# Patient Record
Sex: MALE | Race: WHITE | NOT HISPANIC OR LATINO | URBAN - METROPOLITAN AREA
[De-identification: names, ages, dates, MRNs, and addresses within clinical notes are randomized per-mention and may not be internally consistent; named-entity substitution may affect disease eponyms.]

---

## 2022-01-21 ENCOUNTER — OFFICE VISIT (OUTPATIENT)
Dept: URBAN - METROPOLITAN AREA CLINIC 27 | Facility: CLINIC | Age: 27
End: 2022-01-21

## 2022-01-21 PROBLEM — 238131007 OVERWEIGHT: Status: ACTIVE | Noted: 2022-01-21

## 2022-01-26 ENCOUNTER — LAB OUTSIDE AN ENCOUNTER (OUTPATIENT)
Dept: URBAN - METROPOLITAN AREA CLINIC 121 | Facility: CLINIC | Age: 27
End: 2022-01-26

## 2022-01-26 LAB
A/G RATIO: (no result)
ALBUMIN: 4.4
ALK PHOS: 71
ANTI-HAV: REACTIVE
ANTI-HBC: (no result)
BASOPH COUNT: (no result)
BASOPHIL %: 0.9
BG RANDOM: 90
BILI TOTAL: 0.3
BUN/CREAT: (no result)
BUN: 15
CALCIUM: 9.4
CHLORIDE: 103
CO2: 25
CREATININE: 1.18
CRP: 0.08
EOS COUNT: (no result)
EOSINOPHIL %: 4.5
FERRITIN: 16
GLOBULIN TOT: (no result)
HBSAG: (no result)
HCT: 40.6
HGB: 14.2
IRON SATUR %: (no result)
IRON: 69
LYMPHS %: 19.8
MCH: 29.1
MCHC: (no result)
MCV: 83.2
MONOCYTE %: 9.6
MONOSCT AUTO: (no result)
PLATELETS: (no result)
PMN %: 65.2
POTASSIUM: 4
PROTEIN, TOT: 7.4
RBC: (no result)
RDW: 13.1
SGOT (AST): 15
SGPT (ALT): 11
TIBC: (no result)
WBC: (no result)
ZZ-GE-UNK: (no result)
ZZ-GE-UNK: (no result)
ZZ-GE-UNK: NEGATIVE
ZZ-GE-UNK: REACTIVE

## 2022-04-30 ENCOUNTER — TELEPHONE ENCOUNTER (OUTPATIENT)
Dept: URBAN - METROPOLITAN AREA CLINIC 121 | Facility: CLINIC | Age: 27
End: 2022-04-30

## 2022-05-01 ENCOUNTER — TELEPHONE ENCOUNTER (OUTPATIENT)
Dept: URBAN - METROPOLITAN AREA CLINIC 121 | Facility: CLINIC | Age: 27
End: 2022-05-01

## 2022-05-01 RX ORDER — AZATHIOPRINE 50 MG/1
TAKE 2 TABLET BY MOUTH ONCE A DAY TABLET ORAL
Status: ACTIVE | COMMUNITY
Start: 2022-02-28 | End: 2022-06-28

## 2022-05-01 RX ORDER — NORTRIPTYLINE HYDROCHLORIDE 50 MG/1
TAKE 1 CAPSULE BY MOUTH AT BEDTIME CAPSULE ORAL
Status: ACTIVE | COMMUNITY
Start: 2022-02-28 | End: 2022-06-28

## 2022-05-01 RX ORDER — PREDNISONE 20 MG/1
TAKE 1 TABLET BY MOUTH ONCE A DAY TABLET ORAL
Status: ACTIVE | COMMUNITY
Start: 2022-03-04 | End: 2022-05-03

## 2022-05-25 ENCOUNTER — ERX REFILL RESPONSE (OUTPATIENT)
Dept: URBAN - METROPOLITAN AREA CLINIC 27 | Facility: CLINIC | Age: 27
End: 2022-05-25

## 2022-05-25 RX ORDER — NORTRIPTYLINE HYDROCHLORIDE 50 MG/1
TAKE 1 CAPSULE BY MOUTH EVERYDAY AT BEDTIME CAPSULE ORAL
Qty: 30 CAPSULE | Refills: 4 | OUTPATIENT

## 2022-06-14 ENCOUNTER — TELEPHONE ENCOUNTER (OUTPATIENT)
Dept: URBAN - METROPOLITAN AREA CLINIC 27 | Facility: CLINIC | Age: 27
End: 2022-06-14

## 2022-06-14 RX ORDER — AZATHIOPRINE 50 MG/1
TAKE 2 TABLET TABLET ORAL ONCE A DAY
Qty: 180 | Refills: 3
Start: 2022-02-28 | End: 2023-06-09

## 2022-09-29 ENCOUNTER — ERX REFILL RESPONSE (OUTPATIENT)
Dept: URBAN - METROPOLITAN AREA CLINIC 27 | Facility: CLINIC | Age: 27
End: 2022-09-29

## 2022-09-29 RX ORDER — NORTRIPTYLINE HYDROCHLORIDE 50 MG/1
TAKE 1 CAPSULE BY MOUTH EVERYDAY AT BEDTIME CAPSULE ORAL
Qty: 30 CAPSULE | Refills: 3 | OUTPATIENT

## 2022-09-29 RX ORDER — NORTRIPTYLINE HYDROCHLORIDE 50 MG/1
TAKE 1 CAPSULE BY MOUTH EVERYDAY AT BEDTIME CAPSULE ORAL
Qty: 30 CAPSULE | Refills: 4 | OUTPATIENT

## 2022-12-15 ENCOUNTER — OFFICE VISIT (OUTPATIENT)
Dept: URBAN - METROPOLITAN AREA CLINIC 27 | Facility: CLINIC | Age: 27
End: 2022-12-15
Payer: COMMERCIAL

## 2022-12-15 ENCOUNTER — LAB OUTSIDE AN ENCOUNTER (OUTPATIENT)
Dept: URBAN - METROPOLITAN AREA CLINIC 27 | Facility: CLINIC | Age: 27
End: 2022-12-15

## 2022-12-15 ENCOUNTER — DASHBOARD ENCOUNTERS (OUTPATIENT)
Age: 27
End: 2022-12-15

## 2022-12-15 VITALS
BODY MASS INDEX: 26.6 KG/M2 | SYSTOLIC BLOOD PRESSURE: 144 MMHG | HEIGHT: 71 IN | DIASTOLIC BLOOD PRESSURE: 87 MMHG | WEIGHT: 190 LBS | RESPIRATION RATE: 17 BRPM | HEART RATE: 80 BPM

## 2022-12-15 DIAGNOSIS — K50.90 CROHN'S DISEASE, UNSPECIFIED, WITHOUT COMPLICATIONS: ICD-10-CM

## 2022-12-15 PROCEDURE — 99214 OFFICE O/P EST MOD 30 MIN: CPT | Performed by: INTERNAL MEDICINE

## 2022-12-15 RX ORDER — AZATHIOPRINE 50 MG/1
TAKE 2 TABLET TABLET ORAL ONCE A DAY
Qty: 180 | Refills: 3 | Status: ACTIVE | COMMUNITY
Start: 2022-02-28 | End: 2023-06-09

## 2022-12-15 RX ORDER — NORTRIPTYLINE HYDROCHLORIDE 50 MG/1
TAKE 1 CAPSULE BY MOUTH EVERYDAY AT BEDTIME CAPSULE ORAL
Qty: 30 CAPSULE | Refills: 3 | Status: ACTIVE | COMMUNITY

## 2022-12-15 NOTE — HPI-TODAY'S VISIT:
This is a 27-year-old male seen in follow-up consultation for his Crohn's disease.  He is now on Inflectra which she is taking every 6 weeks.  He takes nortriptyline as well as 100 mg of azathioprine.  He is generally doing quite well.  He has occasional blood on the toilet paper but no straining no diarrhea no blood mixed with the stool.  Previously CBC and CMP were normal and IBD serologies were negative.  His last colonoscopy in [place] was in 2021.  He is status post partial colectomy otherwise feeling well

## 2022-12-18 LAB
A/G RATIO: 1.6
ABSOLUTE BASOPHILS: 63
ABSOLUTE EOSINOPHILS: 200
ABSOLUTE LYMPHOCYTES: 1522
ABSOLUTE MONOCYTES: 633
ABSOLUTE NEUTROPHILS: 3283
ALBUMIN: 4.8
ALKALINE PHOSPHATASE: 63
ALT (SGPT): 11
AST (SGOT): 18
BASOPHILS: 1.1
BILIRUBIN, TOTAL: 0.5
BUN/CREATININE RATIO: (no result)
BUN: 17
C-REACTIVE PROTEIN, QUANT: 0.7
CALCIUM: 9.8
CARBON DIOXIDE, TOTAL: 27
CHLORIDE: 102
CREATININE: 1.12
EGFR: 91
EOSINOPHILS: 3.5
FOLATE (FOLIC ACID), SERUM: 22
GLOBULIN, TOTAL: 3
GLUCOSE: 96
HEMATOCRIT: 45.6
HEMOGLOBIN: 15.5
LYMPHOCYTES: 26.7
MCH: 29.1
MCHC: 34
MCV: 85.6
MONOCYTES: 11.1
MPV: 10
NEUTROPHILS: 57.6
PLATELET COUNT: 310
POTASSIUM: 4.2
PROTEIN, TOTAL: 7.8
RDW: 12.5
RED BLOOD CELL COUNT: 5.33
SODIUM: 138
VITAMIN B12: 567
WHITE BLOOD CELL COUNT: 5.7

## 2022-12-23 ENCOUNTER — LAB OUTSIDE AN ENCOUNTER (OUTPATIENT)
Dept: URBAN - METROPOLITAN AREA CLINIC 27 | Facility: CLINIC | Age: 27
End: 2022-12-23

## 2022-12-23 PROBLEM — 34000006 CROHN'S DISEASE: Status: ACTIVE | Noted: 2022-01-21

## 2023-02-06 ENCOUNTER — ERX REFILL RESPONSE (OUTPATIENT)
Dept: URBAN - METROPOLITAN AREA CLINIC 27 | Facility: CLINIC | Age: 28
End: 2023-02-06

## 2023-02-06 RX ORDER — NORTRIPTYLINE HYDROCHLORIDE 50 MG/1
TAKE 1 CAPSULE BY MOUTH EVERYDAY AT BEDTIME CAPSULE ORAL
Qty: 30 CAPSULE | Refills: 3 | OUTPATIENT

## 2023-02-27 ENCOUNTER — WEB ENCOUNTER (OUTPATIENT)
Dept: URBAN - METROPOLITAN AREA CLINIC 27 | Facility: CLINIC | Age: 28
End: 2023-02-27

## 2023-03-28 ENCOUNTER — TELEPHONE ENCOUNTER (OUTPATIENT)
Dept: URBAN - METROPOLITAN AREA CLINIC 27 | Facility: CLINIC | Age: 28
End: 2023-03-28

## 2023-04-05 ENCOUNTER — TELEPHONE ENCOUNTER (OUTPATIENT)
Dept: URBAN - METROPOLITAN AREA CLINIC 27 | Facility: CLINIC | Age: 28
End: 2023-04-05

## 2023-04-06 ENCOUNTER — WEB ENCOUNTER (OUTPATIENT)
Dept: URBAN - METROPOLITAN AREA CLINIC 27 | Facility: CLINIC | Age: 28
End: 2023-04-06

## 2023-04-11 ENCOUNTER — TELEPHONE ENCOUNTER (OUTPATIENT)
Dept: URBAN - METROPOLITAN AREA CLINIC 27 | Facility: CLINIC | Age: 28
End: 2023-04-11

## 2023-06-05 ENCOUNTER — ERX REFILL RESPONSE (OUTPATIENT)
Dept: URBAN - METROPOLITAN AREA CLINIC 27 | Facility: CLINIC | Age: 28
End: 2023-06-05

## 2023-06-05 RX ORDER — NORTRIPTYLINE HYDROCHLORIDE 50 MG/1
TAKE 1 CAPSULE BY MOUTH EVERYDAY AT BEDTIME CAPSULE ORAL
Qty: 30 CAPSULE | Refills: 3 | OUTPATIENT

## 2023-06-22 ENCOUNTER — TELEPHONE ENCOUNTER (OUTPATIENT)
Dept: URBAN - METROPOLITAN AREA CLINIC 27 | Facility: CLINIC | Age: 28
End: 2023-06-22

## 2023-06-29 ENCOUNTER — TELEPHONE ENCOUNTER (OUTPATIENT)
Dept: URBAN - METROPOLITAN AREA CLINIC 27 | Facility: CLINIC | Age: 28
End: 2023-06-29

## 2023-06-30 ENCOUNTER — TELEPHONE ENCOUNTER (OUTPATIENT)
Dept: URBAN - METROPOLITAN AREA CLINIC 27 | Facility: CLINIC | Age: 28
End: 2023-06-30

## 2023-06-30 ENCOUNTER — ERX REFILL RESPONSE (OUTPATIENT)
Dept: URBAN - METROPOLITAN AREA CLINIC 27 | Facility: CLINIC | Age: 28
End: 2023-06-30

## 2023-06-30 RX ORDER — AZATHIOPRINE 50 MG/1
TAKE 2 TABLET ORAL ONCE A DAY TABLET ORAL
Qty: 60 TABLET | Refills: 11 | OUTPATIENT

## 2023-06-30 RX ORDER — AZATHIOPRINE 50 MG/1
TAKE 2 TABLET ORAL ONCE A DAY TABLET ORAL
Qty: 60 TABLET | Refills: 12 | OUTPATIENT

## 2023-07-31 ENCOUNTER — ERX REFILL RESPONSE (OUTPATIENT)
Dept: URBAN - METROPOLITAN AREA CLINIC 27 | Facility: CLINIC | Age: 28
End: 2023-07-31

## 2023-07-31 RX ORDER — AZATHIOPRINE 50 MG/1
TAKE 2 TABLET ORAL ONCE A DAY TABLET ORAL
Qty: 60 TABLET | Refills: 11 | OUTPATIENT

## 2023-07-31 RX ORDER — AZATHIOPRINE 50 MG/1
TAKE 2 TABLET ORAL ONCE A DAY 30 TABLET ORAL
Qty: 180 TABLET | Refills: 4 | OUTPATIENT

## 2023-08-09 ENCOUNTER — LAB OUTSIDE AN ENCOUNTER (OUTPATIENT)
Dept: URBAN - METROPOLITAN AREA CLINIC 27 | Facility: CLINIC | Age: 28
End: 2023-08-09

## 2023-08-10 LAB
ABSOLUTE BASOPHILS: 49
ABSOLUTE EOSINOPHILS: 132
ABSOLUTE LYMPHOCYTES: 1323
ABSOLUTE MONOCYTES: 426
ABSOLUTE NEUTROPHILS: 2969
BASOPHILS: 1
EOSINOPHILS: 2.7
HEMATOCRIT: 44.9
HEMOGLOBIN: 15.2
LYMPHOCYTES: 27
MCH: 29.1
MCHC: 33.9
MCV: 85.9
MONOCYTES: 8.7
MPV: 9.9
NEUTROPHILS: 60.6
PLATELET COUNT: 280
RDW: 12.3
RED BLOOD CELL COUNT: 5.23
WHITE BLOOD CELL COUNT: 4.9

## 2023-08-11 ENCOUNTER — P2P PATIENT RECORD (OUTPATIENT)
Age: 28
End: 2023-08-11

## 2023-09-28 ENCOUNTER — TELEPHONE ENCOUNTER (OUTPATIENT)
Dept: URBAN - METROPOLITAN AREA CLINIC 27 | Facility: CLINIC | Age: 28
End: 2023-09-28

## 2023-10-05 ENCOUNTER — ERX REFILL RESPONSE (OUTPATIENT)
Dept: URBAN - METROPOLITAN AREA CLINIC 27 | Facility: CLINIC | Age: 28
End: 2023-10-05

## 2023-10-05 RX ORDER — NORTRIPTYLINE HYDROCHLORIDE 50 MG/1
TAKE 1 CAPSULE BY MOUTH EVERYDAY AT BEDTIME CAPSULE ORAL
Qty: 30 CAPSULE | Refills: 3 | OUTPATIENT

## 2023-11-10 ENCOUNTER — ERX REFILL RESPONSE (OUTPATIENT)
Dept: URBAN - METROPOLITAN AREA CLINIC 27 | Facility: CLINIC | Age: 28
End: 2023-11-10

## 2023-11-10 RX ORDER — AZATHIOPRINE 50 MG/1
TAKE 2 TABLET ORAL ONCE A DAY TABLET ORAL
Qty: 180 TABLET | Refills: 5 | OUTPATIENT